# Patient Record
Sex: MALE | Race: WHITE
[De-identification: names, ages, dates, MRNs, and addresses within clinical notes are randomized per-mention and may not be internally consistent; named-entity substitution may affect disease eponyms.]

---

## 2018-02-16 ENCOUNTER — HOSPITAL ENCOUNTER (EMERGENCY)
Dept: HOSPITAL 8 - ED | Age: 38
Discharge: HOME | End: 2018-02-16
Payer: SELF-PAY

## 2018-02-16 ENCOUNTER — HOSPITAL ENCOUNTER (EMERGENCY)
Facility: MEDICAL CENTER | Age: 38
End: 2018-02-16
Payer: MEDICAID

## 2018-02-16 VITALS
HEART RATE: 94 BPM | WEIGHT: 208.56 LBS | TEMPERATURE: 96.6 F | HEIGHT: 70 IN | DIASTOLIC BLOOD PRESSURE: 98 MMHG | RESPIRATION RATE: 16 BRPM | SYSTOLIC BLOOD PRESSURE: 152 MMHG | BODY MASS INDEX: 29.86 KG/M2 | OXYGEN SATURATION: 96 %

## 2018-02-16 VITALS — SYSTOLIC BLOOD PRESSURE: 139 MMHG | DIASTOLIC BLOOD PRESSURE: 90 MMHG

## 2018-02-16 VITALS — WEIGHT: 207.9 LBS | HEIGHT: 70 IN | BODY MASS INDEX: 29.76 KG/M2

## 2018-02-16 DIAGNOSIS — J15.9: Primary | ICD-10-CM

## 2018-02-16 LAB
ALBUMIN SERPL-MCNC: 4.2 G/DL (ref 3.4–5)
ANION GAP SERPL CALC-SCNC: 6 MMOL/L (ref 5–15)
BASOPHILS # BLD AUTO: 0.06 X10^3/UL (ref 0–0.1)
BASOPHILS NFR BLD AUTO: 0 % (ref 0–1)
CALCIUM SERPL-MCNC: 9 MG/DL (ref 8.5–10.1)
CHLORIDE SERPL-SCNC: 103 MMOL/L (ref 98–107)
CREAT SERPL-MCNC: 0.85 MG/DL (ref 0.7–1.3)
EOSINOPHIL # BLD AUTO: 0.29 X10^3/UL (ref 0–0.4)
EOSINOPHIL NFR BLD AUTO: 1 % (ref 1–7)
ERYTHROCYTE [DISTWIDTH] IN BLOOD BY AUTOMATED COUNT: 13.7 % (ref 9.4–14.8)
LYMPHOCYTES # BLD AUTO: 6.04 X10^3/UL (ref 1–3.4)
LYMPHOCYTES NFR BLD AUTO: 27 % (ref 22–44)
MCH RBC QN AUTO: 31 PG (ref 27.5–34.5)
MCHC RBC AUTO-ENTMCNC: 33 G/DL (ref 33.2–36.2)
MCV RBC AUTO: 93.9 FL (ref 81–97)
MD: (no result)
MONOCYTES # BLD AUTO: 1.06 X10^3/UL (ref 0.2–0.8)
MONOCYTES NFR BLD AUTO: 5 % (ref 2–9)
NEUTROPHILS # BLD AUTO: 14.66 X10^3/UL (ref 1.8–6.8)
NEUTROPHILS NFR BLD AUTO: 66 % (ref 42–75)
PLATELET # BLD AUTO: 300 X10^3/UL (ref 130–400)
PMV BLD AUTO: 9.4 FL (ref 7.4–10.4)
RBC # BLD AUTO: 5.76 X10^6/UL (ref 4.38–5.82)

## 2018-02-16 PROCEDURE — 99285 EMERGENCY DEPT VISIT HI MDM: CPT

## 2018-02-16 PROCEDURE — 85025 COMPLETE CBC W/AUTO DIFF WBC: CPT

## 2018-02-16 PROCEDURE — 71046 X-RAY EXAM CHEST 2 VIEWS: CPT

## 2018-02-16 PROCEDURE — 96372 THER/PROPH/DIAG INJ SC/IM: CPT

## 2018-02-16 PROCEDURE — 96365 THER/PROPH/DIAG IV INF INIT: CPT

## 2018-02-16 PROCEDURE — 93005 ELECTROCARDIOGRAM TRACING: CPT

## 2018-02-16 PROCEDURE — 36415 COLL VENOUS BLD VENIPUNCTURE: CPT

## 2018-02-16 PROCEDURE — 83605 ASSAY OF LACTIC ACID: CPT

## 2018-02-16 PROCEDURE — 302449 STATCHG TRIAGE ONLY (STATISTIC)

## 2018-02-16 PROCEDURE — 80048 BASIC METABOLIC PNL TOTAL CA: CPT

## 2018-02-16 PROCEDURE — 82040 ASSAY OF SERUM ALBUMIN: CPT

## 2018-02-16 PROCEDURE — 87040 BLOOD CULTURE FOR BACTERIA: CPT

## 2018-02-17 NOTE — ED TRIAGE NOTES
Pt to triage with back pain. Back pain started with coughing. Pt advised to return to triage nurse for any changes or concerns.

## 2018-03-05 ENCOUNTER — HOSPITAL ENCOUNTER (EMERGENCY)
Facility: MEDICAL CENTER | Age: 38
End: 2018-03-05
Attending: EMERGENCY MEDICINE

## 2018-03-05 ENCOUNTER — APPOINTMENT (OUTPATIENT)
Dept: RADIOLOGY | Facility: MEDICAL CENTER | Age: 38
End: 2018-03-05
Attending: EMERGENCY MEDICINE

## 2018-03-05 VITALS
TEMPERATURE: 97 F | WEIGHT: 210.98 LBS | HEIGHT: 70 IN | DIASTOLIC BLOOD PRESSURE: 83 MMHG | BODY MASS INDEX: 30.2 KG/M2 | SYSTOLIC BLOOD PRESSURE: 163 MMHG | HEART RATE: 87 BPM | OXYGEN SATURATION: 93 % | RESPIRATION RATE: 14 BRPM

## 2018-03-05 DIAGNOSIS — J18.9 PNEUMONIA OF RIGHT LOWER LOBE DUE TO INFECTIOUS ORGANISM: ICD-10-CM

## 2018-03-05 DIAGNOSIS — S22.41XA CLOSED FRACTURE OF MULTIPLE RIBS OF RIGHT SIDE, INITIAL ENCOUNTER: ICD-10-CM

## 2018-03-05 LAB
ALBUMIN SERPL BCP-MCNC: 5 G/DL (ref 3.2–4.9)
ALBUMIN/GLOB SERPL: 2.3 G/DL
ALP SERPL-CCNC: 84 U/L (ref 30–99)
ALT SERPL-CCNC: 53 U/L (ref 2–50)
ANION GAP SERPL CALC-SCNC: 10 MMOL/L (ref 0–11.9)
AST SERPL-CCNC: 25 U/L (ref 12–45)
BASOPHILS # BLD AUTO: 0.9 % (ref 0–1.8)
BASOPHILS # BLD: 0.17 K/UL (ref 0–0.12)
BILIRUB SERPL-MCNC: 0.5 MG/DL (ref 0.1–1.5)
BUN SERPL-MCNC: 14 MG/DL (ref 8–22)
CALCIUM SERPL-MCNC: 9.8 MG/DL (ref 8.5–10.5)
CHLORIDE SERPL-SCNC: 102 MMOL/L (ref 96–112)
CO2 SERPL-SCNC: 25 MMOL/L (ref 20–33)
CREAT SERPL-MCNC: 0.78 MG/DL (ref 0.5–1.4)
EKG IMPRESSION: NORMAL
EOSINOPHIL # BLD AUTO: 0 K/UL (ref 0–0.51)
EOSINOPHIL NFR BLD: 0 % (ref 0–6.9)
ERYTHROCYTE [DISTWIDTH] IN BLOOD BY AUTOMATED COUNT: 44.1 FL (ref 35.9–50)
GLOBULIN SER CALC-MCNC: 2.2 G/DL (ref 1.9–3.5)
GLUCOSE SERPL-MCNC: 99 MG/DL (ref 65–99)
HCT VFR BLD AUTO: 51.3 % (ref 42–52)
HGB BLD-MCNC: 17.2 G/DL (ref 14–18)
LYMPHOCYTES # BLD AUTO: 5.42 K/UL (ref 1–4.8)
LYMPHOCYTES NFR BLD: 28.7 % (ref 22–41)
MANUAL DIFF BLD: NORMAL
MCH RBC QN AUTO: 30.7 PG (ref 27–33)
MCHC RBC AUTO-ENTMCNC: 33.5 G/DL (ref 33.7–35.3)
MCV RBC AUTO: 91.6 FL (ref 81.4–97.8)
MONOCYTES # BLD AUTO: 1.4 K/UL (ref 0–0.85)
MONOCYTES NFR BLD AUTO: 7.4 % (ref 0–13.4)
MORPHOLOGY BLD-IMP: NORMAL
NEUTROPHILS # BLD AUTO: 11.91 K/UL (ref 1.82–7.42)
NEUTROPHILS NFR BLD: 62.1 % (ref 44–72)
NEUTS BAND NFR BLD MANUAL: 0.9 % (ref 0–10)
NRBC # BLD AUTO: 0 K/UL
NRBC BLD-RTO: 0 /100 WBC
PLATELET # BLD AUTO: 284 K/UL (ref 164–446)
PLATELET BLD QL SMEAR: NORMAL
PMV BLD AUTO: 11.1 FL (ref 9–12.9)
POTASSIUM SERPL-SCNC: 3.8 MMOL/L (ref 3.6–5.5)
PROT SERPL-MCNC: 7.2 G/DL (ref 6–8.2)
RBC # BLD AUTO: 5.6 M/UL (ref 4.7–6.1)
RBC BLD AUTO: NORMAL
SODIUM SERPL-SCNC: 137 MMOL/L (ref 135–145)
TROPONIN I SERPL-MCNC: <0.01 NG/ML (ref 0–0.04)
WBC # BLD AUTO: 18.9 K/UL (ref 4.8–10.8)

## 2018-03-05 PROCEDURE — 85007 BL SMEAR W/DIFF WBC COUNT: CPT

## 2018-03-05 PROCEDURE — 85027 COMPLETE CBC AUTOMATED: CPT

## 2018-03-05 PROCEDURE — A9270 NON-COVERED ITEM OR SERVICE: HCPCS | Performed by: EMERGENCY MEDICINE

## 2018-03-05 PROCEDURE — 96375 TX/PRO/DX INJ NEW DRUG ADDON: CPT

## 2018-03-05 PROCEDURE — 80053 COMPREHEN METABOLIC PANEL: CPT

## 2018-03-05 PROCEDURE — 700111 HCHG RX REV CODE 636 W/ 250 OVERRIDE (IP): Performed by: EMERGENCY MEDICINE

## 2018-03-05 PROCEDURE — 71275 CT ANGIOGRAPHY CHEST: CPT

## 2018-03-05 PROCEDURE — 99284 EMERGENCY DEPT VISIT MOD MDM: CPT

## 2018-03-05 PROCEDURE — 93005 ELECTROCARDIOGRAM TRACING: CPT | Performed by: EMERGENCY MEDICINE

## 2018-03-05 PROCEDURE — 84484 ASSAY OF TROPONIN QUANT: CPT

## 2018-03-05 PROCEDURE — 96361 HYDRATE IV INFUSION ADD-ON: CPT

## 2018-03-05 PROCEDURE — 700117 HCHG RX CONTRAST REV CODE 255: Performed by: EMERGENCY MEDICINE

## 2018-03-05 PROCEDURE — 96365 THER/PROPH/DIAG IV INF INIT: CPT

## 2018-03-05 PROCEDURE — 700102 HCHG RX REV CODE 250 W/ 637 OVERRIDE(OP): Performed by: EMERGENCY MEDICINE

## 2018-03-05 PROCEDURE — 36415 COLL VENOUS BLD VENIPUNCTURE: CPT

## 2018-03-05 PROCEDURE — 700105 HCHG RX REV CODE 258: Performed by: EMERGENCY MEDICINE

## 2018-03-05 RX ORDER — DOXYCYCLINE 100 MG/1
100 CAPSULE ORAL 2 TIMES DAILY
Qty: 20 CAP | Refills: 0 | Status: SHIPPED | OUTPATIENT
Start: 2018-03-05 | End: 2018-03-15

## 2018-03-05 RX ORDER — KETOROLAC TROMETHAMINE 30 MG/ML
15 INJECTION, SOLUTION INTRAMUSCULAR; INTRAVENOUS ONCE
Status: COMPLETED | OUTPATIENT
Start: 2018-03-05 | End: 2018-03-05

## 2018-03-05 RX ORDER — SODIUM CHLORIDE 9 MG/ML
1000 INJECTION, SOLUTION INTRAVENOUS ONCE
Status: COMPLETED | OUTPATIENT
Start: 2018-03-05 | End: 2018-03-05

## 2018-03-05 RX ORDER — OXYCODONE HYDROCHLORIDE AND ACETAMINOPHEN 5; 325 MG/1; MG/1
1-2 TABLET ORAL EVERY 4 HOURS PRN
Qty: 20 TAB | Refills: 0 | Status: SHIPPED | OUTPATIENT
Start: 2018-03-05 | End: 2018-03-07

## 2018-03-05 RX ORDER — AZITHROMYCIN 500 MG/1
500 INJECTION, POWDER, LYOPHILIZED, FOR SOLUTION INTRAVENOUS ONCE
Status: DISCONTINUED | OUTPATIENT
Start: 2018-03-05 | End: 2018-03-05

## 2018-03-05 RX ORDER — AZITHROMYCIN 250 MG/1
500 TABLET, FILM COATED ORAL ONCE
Status: COMPLETED | OUTPATIENT
Start: 2018-03-05 | End: 2018-03-05

## 2018-03-05 RX ADMIN — SODIUM CHLORIDE 1000 ML: 9 INJECTION, SOLUTION INTRAVENOUS at 07:30

## 2018-03-05 RX ADMIN — AMPICILLIN SODIUM AND SULBACTAM SODIUM 3 G: 2; 1 INJECTION, POWDER, FOR SOLUTION INTRAMUSCULAR; INTRAVENOUS at 08:21

## 2018-03-05 RX ADMIN — AZITHROMYCIN 500 MG: 250 TABLET, FILM COATED ORAL at 08:16

## 2018-03-05 RX ADMIN — KETOROLAC TROMETHAMINE 15 MG: 30 INJECTION, SOLUTION INTRAMUSCULAR; INTRAVENOUS at 07:28

## 2018-03-05 RX ADMIN — IOHEXOL 100 ML: 350 INJECTION, SOLUTION INTRAVENOUS at 08:30

## 2018-03-05 ASSESSMENT — PAIN SCALES - GENERAL: PAINLEVEL_OUTOF10: 6

## 2018-03-05 NOTE — ED TRIAGE NOTES
"Blood pressure (!) 163/83, pulse (!) 108, temperature 36.1 °C (97 °F), resp. rate 16, height 1.778 m (5' 10\"), weight 95.7 kg (210 lb 15.7 oz), SpO2 94 %.    Chief Complaint   Patient presents with   • Low Back Pain     x3 weeks secondary to coughing, stabbing pain on R side, radiates to ribs     Pt ambulated with a slow, stiff gait to triage, pt's movements are stiff and guarding of the back. Pt was seen and treated for bilateral pneumonia at [facility] x3 weeks prior, at that time the pt experienced severe coughing fits, states he heard a pop, experienced onset of current pain. Pt states the pain has been getting worse.   "

## 2018-03-05 NOTE — DISCHARGE INSTRUCTIONS
Rib Fracture  A rib fracture is a break or crack in one of the bones of the ribs. The ribs are a group of long, curved bones that wrap around your chest and attach to your spine. They protect your lungs and other organs in the chest cavity. A broken or cracked rib is often painful, but most do not cause other problems. Most rib fractures heal on their own over time. However, rib fractures can be more serious if multiple ribs are broken or if broken ribs move out of place and push against other structures.  What are the causes?  · A direct blow to the chest. For example, this could happen during contact sports, a car accident, or a fall against a hard object.  · Repetitive movements with high force, such as pitching a baseball or having severe coughing spells.  What are the signs or symptoms?  · Pain when you breathe in or cough.  · Pain when someone presses on the injured area.  How is this diagnosed?  Your caregiver will perform a physical exam. Various imaging tests may be ordered to confirm the diagnosis and to look for related injuries. These tests may include a chest X-ray, computed tomography (CT), magnetic resonance imaging (MRI), or a bone scan.  How is this treated?  Rib fractures usually heal on their own in 1-3 months. The longer healing period is often associated with a continued cough or other aggravating activities. During the healing period, pain control is very important. Medication is usually given to control pain. Hospitalization or surgery may be needed for more severe injuries, such as those in which multiple ribs are broken or the ribs have moved out of place.  Follow these instructions at home:  · Avoid strenuous activity and any activities or movements that cause pain. Be careful during activities and avoid bumping the injured rib.  · Gradually increase activity as directed by your caregiver.  · Only take over-the-counter or prescription medications as directed by your caregiver. Do not take  other medications without asking your caregiver first.  · Apply ice to the injured area for the first 1-2 days after you have been treated or as directed by your caregiver. Applying ice helps to reduce inflammation and pain.  ¨ Put ice in a plastic bag.  ¨ Place a towel between your skin and the bag.  ¨ Leave the ice on for 15-20 minutes at a time, every 2 hours while you are awake.  · Perform deep breathing as directed by your caregiver. This will help prevent pneumonia, which is a common complication of a broken rib. Your caregiver may instruct you to:  ¨ Take deep breaths several times a day.  ¨ Try to cough several times a day, holding a pillow against the injured area.  ¨ Use a device called an incentive spirometer to practice deep breathing several times a day.  · Drink enough fluids to keep your urine clear or pale yellow. This will help you avoid constipation.  · Do not wear a rib belt or binder. These restrict breathing, which can lead to pneumonia.  Get help right away if:  · You have a fever.  · You have difficulty breathing or shortness of breath.  · You develop a continual cough, or you cough up thick or bloody sputum.  · You feel sick to your stomach (nausea), throw up (vomit), or have abdominal pain.  · You have worsening pain not controlled with medications.  This information is not intended to replace advice given to you by your health care provider. Make sure you discuss any questions you have with your health care provider.  Document Released: 12/18/2006 Document Revised: 05/25/2017 Document Reviewed: 02/19/2014  [employer] Patient Education © 2017 [employer]      Community-Acquired Pneumonia, Adult  Introduction  Pneumonia is an infection of the lungs. One type of pneumonia can happen while a person is in a hospital. A different type can happen when a person is not in a hospital (community-acquired pneumonia). It is easy for this kind to spread from person to person. It can spread to  you if you breathe near an infected person who coughs or sneezes. Some symptoms include:  · A dry cough.  · A wet (productive) cough.  · Fever.  · Sweating.  · Chest pain.  Follow these instructions at home:  · Take over-the-counter and prescription medicines only as told by your doctor.  ¨ Only take cough medicine if you are losing sleep.  ¨ If you were prescribed an antibiotic medicine, take it as told by your doctor. Do not stop taking the antibiotic even if you start to feel better.  · Sleep with your head and neck raised (elevated). You can do this by putting a few pillows under your head, or you can sleep in a recliner.  · Do not use tobacco products. These include cigarettes, chewing tobacco, and e-cigarettes. If you need help quitting, ask your doctor.  · Drink enough water to keep your pee (urine) clear or pale yellow.  A shot (vaccine) can help prevent pneumonia. Shots are often suggested for:  · People older than 65 years of age.  · People older than 19 years of age:  ¨ Who are having cancer treatment.  ¨ Who have long-term (chronic) lung disease.  ¨ Who have problems with their body's defense system (immune system).  You may also prevent pneumonia if you take these actions:  · Get the flu (influenza) shot every year.  · Go to the dentist as often as told.  · Wash your hands often. If soap and water are not available, use hand .  Contact a doctor if:  · You have a fever.  · You lose sleep because your cough medicine does not help.  Get help right away if:  · You are short of breath and it gets worse.  · You have more chest pain.  · Your sickness gets worse. This is very serious if:  ¨ You are an older adult.  ¨ Your body's defense system is weak.  · You cough up blood.  This information is not intended to replace advice given to you by your health care provider. Make sure you discuss any questions you have with your health care provider.  Document Released: 06/05/2009 Document Revised: 05/25/2017  Document Reviewed: 04/13/2016  © 2017 [employer]

## 2018-03-05 NOTE — ED PROVIDER NOTES
"ED Provider Note    CHIEF COMPLAINT  Chief Complaint   Patient presents with   • Low Back Pain     x3 weeks secondary to coughing, stabbing pain on R side, radiates to ribs       HPI  Alex Jackson is a 37 y.o. male who presents to the ER with right-sided back pain. It started about 2 weeks ago. He was sick with coughing. He coughed forcefully and developed pain in the right mid back. He was seen at [place]. He was diagnosed with pneumonia. He was given a course of antibiotics. He states his symptoms didn't really change very much. He continues to cough up some mucus. He has not had hemoptysis. Every time that he coughs he has severe stabbing pain. It is now moving around to the right lateral chest wall. He has not had a fever. He feels breathless as it hurts to breathe. He denies orthopnea or PND. Denies leg swelling. No immobilization, surgeries, hospital admission.    REVIEW OF SYSTEMS  As per HPI, otherwise a 10 point review of systems is negative    PAST MEDICAL HISTORY  Back pain, neck pain    SOCIAL HISTORY  Social History   Substance Use Topics   • Smoking status: Current Every Day Smoker   • Smokeless tobacco: Never Used      Comment: 1/2 ppd since age 18   • Alcohol use No      Comment: occ       SURGICAL HISTORY  Past Surgical History:   Procedure Laterality Date   • TONSILLECTOMY AND ADENOIDECTOMY         CURRENT MEDICATIONS  Home Medications     Reviewed by Donya Velazquez R.N. (Registered Nurse) on 03/05/18 at 0705  Med List Status: Partial   Medication Last Dose Status   cyclobenzaprine (FLEXERIL) 10 MG Tab ran out Active   cyclobenzaprine (FLEXERIL) 10 MG Tab  Active   hydrocodone-acetaminophen (NORCO) 5-325 MG Tab per tablet not taking Active   oxycodone-acetaminophen (PERCOCET) 5-325 MG Tab ran out Active                ALLERGIES  No Known Allergies    PHYSICAL EXAM  VITAL SIGNS: BP (!) 163/83   Pulse (!) 108   Temp 36.1 °C (97 °F)   Resp 16   Ht 1.778 m (5' 10\")   Wt 95.7 kg " (210 lb 15.7 oz)   SpO2 94%   BMI 30.27 kg/m²    Constitutional: Awake and alert, anxious  HENT:  Atraumatic, Normocephalic.Oropharynx dry mucus membranes, Nose normal inspection.   Eyes: Normal inspection  Neck: Supple  Cardiovascular: Mildly tachycardic heart rate, Normal rhythm.  Symmetric peripheral pulses.   Thorax & Lungs: No respiratory distress, No wheezing, No rales, No rhonchi, mild right posterior lateral chest wall thoracic tenderness  Abdomen: Bowel sounds normal, soft, non-distended, nontender, no mass  Skin: Warm, Dry, No rash.   Back: No midline tenderness  Extremities: No clubbing, cyanosis, edema, no Homans or cords   Neurologic: Grossly normal   Psychiatric: Anxious appearing    RADIOLOGY/PROCEDURES  CT-CTA CHEST PULMONARY ARTERY W/ RECONS   Final Result      No central or large segmental pulmonary embolus is identified.      Bibasilar opacities likely represent atelectasis.      Bibasilar groundglass opacities may be infectious or inflammatory.      Mildly prominent mediastinal and hilar lymph nodes are nonspecific.      Fractures of the posterior seventh and eighth ribs on the right.      Trace right pleural effusion.      No pneumothorax.                     Imaging is interpreted by radiologist    Labs:  Results for orders placed or performed during the hospital encounter of 03/05/18   CBC w/ Differential   Result Value Ref Range    WBC 18.9 (H) 4.8 - 10.8 K/uL    RBC 5.60 4.70 - 6.10 M/uL    Hemoglobin 17.2 14.0 - 18.0 g/dL    Hematocrit 51.3 42.0 - 52.0 %    MCV 91.6 81.4 - 97.8 fL    MCH 30.7 27.0 - 33.0 pg    MCHC 33.5 (L) 33.7 - 35.3 g/dL    RDW 44.1 35.9 - 50.0 fL    Platelet Count 284 164 - 446 K/uL    MPV 11.1 9.0 - 12.9 fL    Nucleated RBC 0.00 /100 WBC    NRBC (Absolute) 0.00 K/uL    Neutrophils-Polys 62.10 44.00 - 72.00 %    Lymphocytes 28.70 22.00 - 41.00 %    Monocytes 7.40 0.00 - 13.40 %    Eosinophils 0.00 0.00 - 6.90 %    Basophils 0.90 0.00 - 1.80 %    Neutrophils (Absolute)  11.91 (H) 1.82 - 7.42 K/uL    Lymphs (Absolute) 5.42 (H) 1.00 - 4.80 K/uL    Monos (Absolute) 1.40 (H) 0.00 - 0.85 K/uL    Eos (Absolute) 0.00 0.00 - 0.51 K/uL    Baso (Absolute) 0.17 (H) 0.00 - 0.12 K/uL   Complete Metabolic Panel (CMP)   Result Value Ref Range    Sodium 137 135 - 145 mmol/L    Potassium 3.8 3.6 - 5.5 mmol/L    Chloride 102 96 - 112 mmol/L    Co2 25 20 - 33 mmol/L    Anion Gap 10.0 0.0 - 11.9    Glucose 99 65 - 99 mg/dL    Bun 14 8 - 22 mg/dL    Creatinine 0.78 0.50 - 1.40 mg/dL    Calcium 9.8 8.5 - 10.5 mg/dL    AST(SGOT) 25 12 - 45 U/L    ALT(SGPT) 53 (H) 2 - 50 U/L    Alkaline Phosphatase 84 30 - 99 U/L    Total Bilirubin 0.5 0.1 - 1.5 mg/dL    Albumin 5.0 (H) 3.2 - 4.9 g/dL    Total Protein 7.2 6.0 - 8.2 g/dL    Globulin 2.2 1.9 - 3.5 g/dL    A-G Ratio 2.3 g/dL   Troponin STAT   Result Value Ref Range    Troponin I <0.01 0.00 - 0.04 ng/mL   ESTIMATED GFR   Result Value Ref Range    GFR If African American >60 >60 mL/min/1.73 m 2    GFR If Non African American >60 >60 mL/min/1.73 m 2   DIFFERENTIAL MANUAL   Result Value Ref Range    Bands-Stabs 0.90 0.00 - 10.00 %    Manual Diff Status PERFORMED    PERIPHERAL SMEAR REVIEW   Result Value Ref Range    Peripheral Smear Review see below    PLATELET ESTIMATE   Result Value Ref Range    Plt Estimation Normal    MORPHOLOGY   Result Value Ref Range    RBC Morphology Normal    EKG (NOW)   Result Value Ref Range    Report       [facility] Emergency Dept.    Test Date:  2018  Pt Name:    JONO ALTMAN             Department: ER  MRN:        [id number]                      Room:        29  Gender:     Male                         Technician: [id number]  :        1980                   Requested By:DYLLAN GARCIA  Order #:    [id number]                    Reading MD: DYLLAN GARCIA MD    Measurements  Intervals                                Axis  Rate:       94                           P:          58  MS:         148                           QRS:        64  QRSD:       92                           T:          7  QT:         336  QTc:        421    Interpretive Statements  SINUS RHYTHM  No previous ECG available for comparison    Electronically Signed On 3-5-2018 10:40:28 PST by DYLLAN GARCIA MD             IV fluids given secondary to dry mucous membranes and tachycardia indicating dehydration as well as CT scan with IV contrast ordered.    COURSE & MEDICAL DECISION MAKING  Patient presents to the ER with right-sided chest/thoracic pain after coughing. He has pleuritic symptoms. He was tachycardic. Differential includes pneumonia, rib fracture, PE, pleural effusion, shingles etc. I reviewed his CT scan. I ordered Toradol intravenously. Because of his tachycardia and dry mucous membranes ordered 1 L saline. Tachycardia improved after treatment. Data returned showing significant leukocytosis. Pneumonia probability was raised on the differential and at that time I ordered azithromycin and Unasyn intravenously. CT pulmonary angiogram returned without evidence of PE, but he was noted to have 2 right-sided rib fractures which would explain his symptoms. This is likely from coughing forcefully. There was groundglass opacity and questionable infiltrate. Because of leukocytosis I will cover him for potential pneumonia. His symptoms were improved with Toradol. He is not hypoxic or tachycardic. He has a good pulmonary toilet. These fractures sound to have been subacute. He is appropriate for ongoing outpatient management. I discussed with him that he needs to continue to cough and take a deep breath. He was given an incentive spirometer with instructions. I have given him a prescription for Percocet. Potential infection will be treated with doxycycline. I've referred him to the [facility] for recheck in one week. I precautioned him to return to the ER for worsening symptoms, not improving, difficulty breathing or concern.      FINAL  IMPRESSION  1. Multiple rib fractures  2. Possible developing pneumonia        This dictation was created using voice recognition software. The accuracy of the dictation is limited to the abilities of the software.  The nursing notes were reviewed and certain aspects of this information were incorporated into this note.      Electronically signed by: Glenn Ewing, 3/5/2018 7:06 AM